# Patient Record
Sex: FEMALE | URBAN - METROPOLITAN AREA
[De-identification: names, ages, dates, MRNs, and addresses within clinical notes are randomized per-mention and may not be internally consistent; named-entity substitution may affect disease eponyms.]

---

## 2022-02-21 ENCOUNTER — OFFICE VISIT (OUTPATIENT)
Dept: URBAN - METROPOLITAN AREA CLINIC 28 | Facility: CLINIC | Age: 87
End: 2022-02-21
Payer: MEDICARE

## 2022-02-21 DIAGNOSIS — H35.362 DRUSEN OF MACULA OF LEFT EYE: ICD-10-CM

## 2022-02-21 DIAGNOSIS — H02.824 CYSTS OF LEFT UPPER EYELID: Primary | ICD-10-CM

## 2022-02-21 DIAGNOSIS — H53.002 AMBLYOPIA OF LT EYE: ICD-10-CM

## 2022-02-21 PROCEDURE — 92004 COMPRE OPH EXAM NEW PT 1/>: CPT | Performed by: OPTOMETRIST

## 2022-02-21 ASSESSMENT — INTRAOCULAR PRESSURE
OD: 17
OS: 17

## 2022-02-21 NOTE — IMPRESSION/PLAN
Impression: Cysts of left upper eyelid: H02.824. Plan: Educated on exam findings. Based on location, recommend evaluation and drainage with oculoplastics. Monitor.

## 2022-02-21 NOTE — IMPRESSION/PLAN
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 Plan: Educated on exam findings. Central pigment clump/changes OS. Reassured patient and will continue to monitor.

## 2022-02-21 NOTE — IMPRESSION/PLAN
Impression: Amblyopia of lt eye: H53.002. Plan: Educated on exam findings. Pt's daughter states longstanding reduced vision OS since childhood and h/o EOM issues as a child. Monitor.

## 2022-06-02 ENCOUNTER — OFFICE VISIT (OUTPATIENT)
Dept: URBAN - METROPOLITAN AREA CLINIC 32 | Facility: CLINIC | Age: 87
End: 2022-06-02
Payer: MEDICARE

## 2022-06-02 DIAGNOSIS — H02.824 CYSTS OF LEFT UPPER EYELID: Primary | ICD-10-CM

## 2022-06-02 PROCEDURE — 99203 OFFICE O/P NEW LOW 30 MIN: CPT | Performed by: OPHTHALMOLOGY

## 2022-06-02 NOTE — IMPRESSION/PLAN
Impression: Cysts of left upper eyelid: H02.824. Left. Condition: established, stable. Symptoms: may improve with surgery. Plan: Discussed diagnosis in detail with patient. Discussed treatment options with patient. Surgical treatment is required. Surgical risks and benefits were discussed, explained and understood by patient. Patient states this is not bothersome at this time. Return when bothersome for evaluation.